# Patient Record
Sex: FEMALE | Race: WHITE
[De-identification: names, ages, dates, MRNs, and addresses within clinical notes are randomized per-mention and may not be internally consistent; named-entity substitution may affect disease eponyms.]

---

## 2017-01-10 ENCOUNTER — HOSPITAL ENCOUNTER (OUTPATIENT)
Dept: HOSPITAL 45 - C.LABPVFM | Age: 62
Discharge: HOME | End: 2017-01-10
Attending: FAMILY MEDICINE
Payer: COMMERCIAL

## 2017-01-10 DIAGNOSIS — Z13.220: ICD-10-CM

## 2017-01-10 DIAGNOSIS — M81.0: ICD-10-CM

## 2017-01-10 DIAGNOSIS — F41.8: ICD-10-CM

## 2017-01-10 DIAGNOSIS — Z00.00: Primary | ICD-10-CM

## 2017-01-10 LAB
ALBUMIN/GLOB SERPL: 1.2 {RATIO} (ref 0.9–2)
ALP SERPL-CCNC: 50 U/L (ref 45–117)
ALT SERPL-CCNC: 38 U/L (ref 12–78)
ANION GAP SERPL CALC-SCNC: 9 MMOL/L (ref 3–11)
AST SERPL-CCNC: 32 U/L (ref 15–37)
BASOPHILS # BLD: 0.04 K/UL (ref 0–0.2)
BASOPHILS NFR BLD: 0.7 %
BUN SERPL-MCNC: 12 MG/DL (ref 7–18)
BUN/CREAT SERPL: 13.8 (ref 10–20)
CALCIUM SERPL-MCNC: 8.9 MG/DL (ref 8.5–10.1)
CHLORIDE SERPL-SCNC: 107 MMOL/L (ref 98–107)
CHOLEST/HDLC SERPL: 1.6 {RATIO}
CO2 SERPL-SCNC: 27 MMOL/L (ref 21–32)
COMPLETE: YES
CREAT SERPL-MCNC: 0.86 MG/DL (ref 0.6–1.2)
EOSINOPHIL NFR BLD AUTO: 282 K/UL (ref 130–400)
GLOBULIN SER-MCNC: 3.3 GM/DL (ref 2.5–4)
GLUCOSE SERPL-MCNC: 87 MG/DL (ref 70–99)
GLUCOSE UR QL: 122 MG/DL
HCT VFR BLD CALC: 38 % (ref 37–47)
IG%: 0.2 %
IMM GRANULOCYTES NFR BLD AUTO: 31.3 %
KETONES UR QL STRIP: 67 MG/DL
LYMPHOCYTES # BLD: 1.68 K/UL (ref 1.2–3.4)
MCH RBC QN AUTO: 31.3 PG (ref 25–34)
MCHC RBC AUTO-ENTMCNC: 34.2 G/DL (ref 32–36)
MCV RBC AUTO: 91.6 FL (ref 80–100)
MONOCYTES NFR BLD: 11 %
NEUTROPHILS # BLD AUTO: 3.5 %
NEUTROPHILS NFR BLD AUTO: 53.3 %
NITRITE UR QL STRIP: 50 MG/DL (ref 0–150)
PH UR: 199 MG/DL (ref 0–200)
PMV BLD AUTO: 10.8 FL (ref 7.4–10.4)
POTASSIUM SERPL-SCNC: 3.9 MMOL/L (ref 3.5–5.1)
RBC # BLD AUTO: 4.15 M/UL (ref 4.2–5.4)
SODIUM SERPL-SCNC: 143 MMOL/L (ref 136–145)
VERY LOW DENSITY LIPOPROT CALC: 10 MG/DL
WBC # BLD AUTO: 5.36 K/UL (ref 4.8–10.8)

## 2017-01-16 ENCOUNTER — HOSPITAL ENCOUNTER (OUTPATIENT)
Dept: HOSPITAL 45 - C.PAPS | Age: 62
Discharge: HOME | End: 2017-01-16
Attending: OBSTETRICS & GYNECOLOGY
Payer: COMMERCIAL

## 2017-01-16 DIAGNOSIS — Z01.419: Primary | ICD-10-CM

## 2017-01-31 ENCOUNTER — HOSPITAL ENCOUNTER (OUTPATIENT)
Dept: HOSPITAL 45 - C.RADPV | Age: 62
Discharge: HOME | End: 2017-01-31
Attending: FAMILY MEDICINE
Payer: COMMERCIAL

## 2017-01-31 DIAGNOSIS — S93.409A: Primary | ICD-10-CM
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
## 2017-01-31 NOTE — DIAGNOSTIC IMAGING REPORT
ANKLE MIN 3 VIEWS ROUTINE



CLINICAL HISTORY: LT pain



COMPARISON: None.



DISCUSSION: The bones and joint spaces appear intact. There is no evidence of

fracture, dislocation or bony disease. There is no evidence for soft tissue

swelling.



IMPRESSION: Negative study.







Electronically signed by:  Juan Miguel Maloney M.D.

1/31/2017 11:20 AM



Dictated Date/Time:  1/31/2017 11:20 AM

## 2017-03-13 ENCOUNTER — HOSPITAL ENCOUNTER (OUTPATIENT)
Dept: HOSPITAL 45 - C.MAMM | Age: 62
Discharge: HOME | End: 2017-03-13
Attending: INTERNAL MEDICINE
Payer: COMMERCIAL

## 2017-03-13 DIAGNOSIS — M81.0: Primary | ICD-10-CM

## 2017-03-29 ENCOUNTER — HOSPITAL ENCOUNTER (OUTPATIENT)
Dept: HOSPITAL 45 - C.LAB1850 | Age: 62
Discharge: HOME | End: 2017-03-29
Attending: INTERNAL MEDICINE
Payer: COMMERCIAL

## 2017-03-29 ENCOUNTER — HOSPITAL ENCOUNTER (OUTPATIENT)
Dept: HOSPITAL 45 - C.MAMM | Age: 62
Discharge: HOME | End: 2017-03-29
Attending: OBSTETRICS & GYNECOLOGY
Payer: COMMERCIAL

## 2017-03-29 DIAGNOSIS — M81.0: Primary | ICD-10-CM

## 2017-03-29 DIAGNOSIS — Z12.31: Primary | ICD-10-CM

## 2017-03-29 DIAGNOSIS — E55.9: ICD-10-CM

## 2017-03-29 NOTE — MAMMOGRAPHY REPORT
BILATERAL DIGITAL SCREENING MAMMOGRAM WITH CAD: 3/29/2017

CLINICAL HISTORY: Routine screening.  Patient has no complaints.  





TECHNIQUE:  Current study was also evaluated with a Computer Aided Detection (CAD) system.  Bilatera
l CC and MLO views were obtained.



COMPARISON: Comparison is made to exams dated:  12/15/2015 mammogram, 5/10/2013 mammogram, 10/7/2014
 mammogram, 5/24/2013 mammogram, and 5/7/2012 mammogram - [facility].   



BREAST COMPOSITION:  There are scattered areas of fibroglandular density in both breasts.  



FINDINGS:  No suspicious masses, calcifications, or areas of architectural distortion are noted in e
ither breast. There has been no significant interval change compared to prior exams. Scattered bilat
eral benign-appearing calcifications are not significantly changed.  





IMPRESSION:  ACR BI-RADS CATEGORY 2: BENIGN

There is no mammographic evidence of malignancy. A 1 year screening mammogram is recommended.  The p
atient will receive written notification of the results.  





Approximately 10% of breast cancers are not detected with mammography. A negative mammographic repor
t should not delay biopsy if a clinically suggestive mass is present.



Mely Delong M.D.          

ah/:3/29/2017 15:36:26  



Imaging Technologist: Marko LIGHT(SHERICE)(JOYCE), [facility]

letter sent: Normal 1/2  

BI-RADS Code: ACR BI-RADS Category 2: Benign

## 2018-01-26 ENCOUNTER — HOSPITAL ENCOUNTER (OUTPATIENT)
Dept: HOSPITAL 45 - C.LAB1850 | Age: 63
Discharge: HOME | End: 2018-01-26
Attending: INTERNAL MEDICINE
Payer: COMMERCIAL

## 2018-01-26 DIAGNOSIS — M81.0: Primary | ICD-10-CM

## 2018-01-26 LAB
ALBUMIN SERPL-MCNC: 3.4 GM/DL (ref 3.4–5)
CALCIUM SERPL-MCNC: 9.6 MG/DL (ref 8.5–10.1)

## 2018-04-04 ENCOUNTER — HOSPITAL ENCOUNTER (OUTPATIENT)
Dept: HOSPITAL 45 - C.PAPS | Age: 63
Discharge: HOME | End: 2018-04-04
Attending: OBSTETRICS & GYNECOLOGY
Payer: COMMERCIAL

## 2018-04-04 DIAGNOSIS — C55: Primary | ICD-10-CM

## 2018-04-05 ENCOUNTER — HOSPITAL ENCOUNTER (OUTPATIENT)
Dept: HOSPITAL 45 - C.MAMM | Age: 63
Discharge: HOME | End: 2018-04-05
Attending: OBSTETRICS & GYNECOLOGY
Payer: COMMERCIAL

## 2018-04-05 DIAGNOSIS — Z12.31: Primary | ICD-10-CM

## 2018-04-05 NOTE — MAMMOGRAPHY REPORT
BILATERAL DIGITAL SCREENING MAMMOGRAM TOMOSYNTHESIS WITH CAD: 4/5/2018

CLINICAL HISTORY: Routine screening.  





TECHNIQUE:  Breast tomosynthesis in addition to standard 2D mammography was performed. Current study 
was also evaluated with a Computer Aided Detection (CAD) system.  



COMPARISON: Comparison is made to exams dated:  3/29/2017 mammogram, 12/15/2015 mammogram, 10/7/2014 
mammogram, 5/10/2013 mammogram, 10/28/2011 mammogram, and 10/26/2009 mammogram - [facility].   



BREAST COMPOSITION:  There are scattered areas of fibroglandular density in both breasts.  



FINDINGS:  No suspicious masses, calcifications, or areas of architectural distortion are noted in ei
ther breast. There has been no significant interval change compared to prior exams. Scattered bilater
al benign-appearing calcifications are not significantly changed.  





IMPRESSION:  ACR BI-RADS CATEGORY 2: BENIGN

There is no mammographic evidence of malignancy. A 1 year screening mammogram is recommended.  The pa
tient will receive written notification of the results.  





Approximately 10% of breast cancers are not detected with mammography. A negative mammographic report
 should not delay biopsy if a clinically suggestive mass is present.



Mely Delong M.D.          

/:4/5/2018 14:20:26  



Imaging Technologist: Marko LIGHT(SHERICE)(M), [facility]

letter sent: Normal 1/2  

BI-RADS Code: ACR BI-RADS Category 2: Benign

## 2018-04-18 ENCOUNTER — HOSPITAL ENCOUNTER (OUTPATIENT)
Dept: HOSPITAL 45 - C.PATHSPEC | Age: 63
Discharge: HOME | End: 2018-04-18
Attending: OBSTETRICS & GYNECOLOGY
Payer: COMMERCIAL

## 2018-04-18 DIAGNOSIS — N89.1: Primary | ICD-10-CM

## 2018-07-27 ENCOUNTER — HOSPITAL ENCOUNTER (OUTPATIENT)
Dept: HOSPITAL 45 - C.RAD | Age: 63
Discharge: HOME | End: 2018-07-27
Attending: OBSTETRICS & GYNECOLOGY
Payer: COMMERCIAL

## 2018-07-27 DIAGNOSIS — N89.3: ICD-10-CM

## 2018-07-27 DIAGNOSIS — Z01.818: Primary | ICD-10-CM

## 2018-07-27 LAB
ALBUMIN SERPL-MCNC: 3.8 GM/DL (ref 3.4–5)
ALP SERPL-CCNC: 77 U/L (ref 45–117)
ALT SERPL-CCNC: 44 U/L (ref 12–78)
AST SERPL-CCNC: 33 U/L (ref 15–37)
BUN SERPL-MCNC: 16 MG/DL (ref 7–18)
CALCIUM SERPL-MCNC: 9.2 MG/DL (ref 8.5–10.1)
CO2 SERPL-SCNC: 28 MMOL/L (ref 21–32)
CREAT SERPL-MCNC: 0.87 MG/DL (ref 0.6–1.2)
EOSINOPHIL NFR BLD AUTO: 305 K/UL (ref 130–400)
GLUCOSE SERPL-MCNC: 99 MG/DL (ref 70–99)
HCT VFR BLD CALC: 39.4 % (ref 37–47)
HGB BLD-MCNC: 13.6 G/DL (ref 12–16)
MCH RBC QN AUTO: 31.6 PG (ref 25–34)
MCHC RBC AUTO-ENTMCNC: 34.5 G/DL (ref 32–36)
MCV RBC AUTO: 91.4 FL (ref 80–100)
PMV BLD AUTO: 10.5 FL (ref 7.4–10.4)
POTASSIUM SERPL-SCNC: 3.8 MMOL/L (ref 3.5–5.1)
PROT SERPL-MCNC: 7.4 GM/DL (ref 6.4–8.2)
RED CELL DISTRIBUTION WIDTH CV: 12.4 % (ref 11.5–14.5)
RED CELL DISTRIBUTION WIDTH SD: 41.7 FL (ref 36.4–46.3)
SODIUM SERPL-SCNC: 141 MMOL/L (ref 136–145)
WBC # BLD AUTO: 5.73 K/UL (ref 4.8–10.8)

## 2018-07-27 NOTE — DIAGNOSTIC IMAGING REPORT
CHEST 2 VIEWS ROUTINE



CLINICAL HISTORY: Preoperative chest    



COMPARISON STUDY:  No previous studies for comparison.



FINDINGS: The cardiac and mediastinal contours are normal. There is no evidence

of focal pulmonary consolidation. There is no evidence of failure. No pleural

effusions are visualized.[ 



IMPRESSION: No active disease in the chest.







Electronically signed by:  Niko Ramírez M.D.

7/27/2018 4:47 PM



Dictated Date/Time:  7/27/2018 4:46 PM